# Patient Record
Sex: FEMALE | Race: BLACK OR AFRICAN AMERICAN | NOT HISPANIC OR LATINO | Employment: FULL TIME | RURAL
[De-identification: names, ages, dates, MRNs, and addresses within clinical notes are randomized per-mention and may not be internally consistent; named-entity substitution may affect disease eponyms.]

---

## 2022-11-11 ENCOUNTER — OFFICE VISIT (OUTPATIENT)
Dept: FAMILY MEDICINE CLINIC | Facility: CLINIC | Age: 27
End: 2022-11-11

## 2022-11-11 VITALS
SYSTOLIC BLOOD PRESSURE: 98 MMHG | HEART RATE: 70 BPM | OXYGEN SATURATION: 97 % | WEIGHT: 154 LBS | BODY MASS INDEX: 27.29 KG/M2 | TEMPERATURE: 97.9 F | DIASTOLIC BLOOD PRESSURE: 60 MMHG | HEIGHT: 63 IN

## 2022-11-11 DIAGNOSIS — Z23 FLU VACCINE NEED: ICD-10-CM

## 2022-11-11 DIAGNOSIS — K64.4 EXTERNAL HEMORRHOIDS: Primary | ICD-10-CM

## 2022-11-11 PROCEDURE — 90471 IMMUNIZATION ADMIN: CPT | Performed by: NURSE PRACTITIONER

## 2022-11-11 PROCEDURE — 90686 IIV4 VACC NO PRSV 0.5 ML IM: CPT | Performed by: NURSE PRACTITIONER

## 2022-11-11 PROCEDURE — 99203 OFFICE O/P NEW LOW 30 MIN: CPT | Performed by: NURSE PRACTITIONER

## 2022-11-11 RX ORDER — HYDROCORTISONE 25 MG/G
CREAM TOPICAL 2 TIMES DAILY
Qty: 28 G | Refills: 1 | Status: SHIPPED | OUTPATIENT
Start: 2022-11-11

## 2022-11-11 NOTE — PROGRESS NOTES
"Chief Complaint  Hemorrhoids (X 3 wks)    Subjective          Cathie Zazueta presents to [facility] [place]    History of Present Illness  [facility]    PCP: none--plans to establish with PCP at [facility] [place]--cards given    CC: \"hemorrhoids\"    C/O external hemorrhoids x 3 weeks after dealing with constipation.  Now taking fiber daily and constipation has improved, but still dealing with hemorrhoids.  OTC meds, sitz baths haven't really helped.  Denies rectal bleeding, but has had pain and itching.  No abdominal pain reported.      Normally healthy.  Denies any chronic medical problems.     Requesting seasonal influenza vaccine today as well.       Hemorrhoids  This is a new (had one other time about 2 years ago, but resolved with OTC treatment) problem. The current episode started 1 to 4 weeks ago (x3 weeks). The problem occurs constantly. The problem has been unchanged. Associated symptoms include a change in bowel habit (had been constipated; starting taking fiber which has helped). Pertinent negatives include no abdominal pain, anorexia, arthralgias, chest pain, chills, congestion, coughing, diaphoresis, fatigue, fever, headaches, joint swelling, myalgias, nausea, neck pain, numbness, rash, sore throat, swollen glands, urinary symptoms, vertigo, visual change, vomiting or weakness. Exacerbated by: bowel movements; also drives a truck, does frequent heavy lifting. Treatments tried: otc hemorrhoidal creams; sitz baths. The treatment provided no relief.       Review of Systems   Constitutional: Negative.  Negative for chills, diaphoresis, fatigue and fever.   HENT: Negative for congestion and sore throat.    Respiratory: Negative.  Negative for cough.    Cardiovascular: Negative.  Negative for chest pain.   Gastrointestinal: Positive for change in bowel habit (had been constipated; starting taking fiber which has helped), constipation (prior to " "hemorrhoids--now taking fiber daily, improved), hemorrhoids and rectal pain ( hemorrhoids, worse with BM;s). Negative for abdominal pain, anal bleeding, anorexia, blood in stool, diarrhea, nausea and vomiting.   Genitourinary: Negative.    Musculoskeletal: Negative.  Negative for arthralgias, joint swelling, myalgias and neck pain.   Skin: Negative.  Negative for rash.   Neurological: Negative for dizziness, vertigo, weakness, numbness and headaches.        Objective   Vital Signs:   BP 98/60 (BP Location: Right arm, Patient Position: Sitting, Cuff Size: Adult)   Pulse 70   Temp 97.9 °F (36.6 °C) (Oral)   Ht 160 cm (63\")   Wt 69.9 kg (154 lb)   SpO2 97%   BMI 27.28 kg/m²       Physical Exam  Vitals and nursing note reviewed.   Constitutional:       General: She is not in acute distress.     Appearance: Normal appearance. She is not ill-appearing.   HENT:      Head: Normocephalic and atraumatic.   Cardiovascular:      Rate and Rhythm: Normal rate and regular rhythm.   Pulmonary:      Effort: Pulmonary effort is normal. No respiratory distress.      Breath sounds: Normal breath sounds. No wheezing, rhonchi or rales.   Abdominal:      General: Bowel sounds are normal.      Palpations: Abdomen is soft.      Tenderness: There is no abdominal tenderness. There is no right CVA tenderness, left CVA tenderness, guarding or rebound.   Genitourinary:     Rectum: Tenderness and external hemorrhoid (multiple, inflamed tags, none that are noted to be thrombosed) present.      Comments: Internal exam deferred  Musculoskeletal:      Cervical back: Neck supple.   Skin:     General: Skin is warm and dry.   Neurological:      General: No focal deficit present.      Mental Status: She is alert and oriented to person, place, and time.   Psychiatric:         Mood and Affect: Mood normal.         Thought Content: Thought content normal.          Result Review :                 Assessment and Plan    Diagnoses and all orders for this " visit:    1. External hemorrhoids (Primary)  -     Hydrocortisone, Perianal, (Anusol-HC) 2.5 % rectal cream; Insert  into the rectum 2 (Two) Times a Day.  Dispense: 28 g; Refill: 1    2. Flu vaccine need  -     FluLaval/Fluzone >6 mos ([id number])      Continue with fiber or stool softeners daily  Sitz baths PRN  Rx for Anusol HC cream provided  Try to limit lifting/straining as much as possible  If persistent symptoms, may need to consider Gen Surg referral   Encouraged to schedule appt to establish with PCP--cards given    Influenza vaccine given in office today.       This document has been electronically signed by SANTIAGO Garcia on November 11, 2022 09:55 CST,.